# Patient Record
Sex: FEMALE | Race: WHITE
[De-identification: names, ages, dates, MRNs, and addresses within clinical notes are randomized per-mention and may not be internally consistent; named-entity substitution may affect disease eponyms.]

---

## 2019-09-20 ENCOUNTER — HOSPITAL ENCOUNTER (EMERGENCY)
Dept: HOSPITAL 62 - ER | Age: 44
Discharge: HOME | End: 2019-09-20
Payer: SELF-PAY

## 2019-09-20 VITALS — SYSTOLIC BLOOD PRESSURE: 141 MMHG | DIASTOLIC BLOOD PRESSURE: 77 MMHG

## 2019-09-20 DIAGNOSIS — J20.9: Primary | ICD-10-CM

## 2019-09-20 DIAGNOSIS — R05: ICD-10-CM

## 2019-09-20 DIAGNOSIS — R07.9: ICD-10-CM

## 2019-09-20 DIAGNOSIS — R06.02: ICD-10-CM

## 2019-09-20 PROCEDURE — 99284 EMERGENCY DEPT VISIT MOD MDM: CPT

## 2019-09-20 PROCEDURE — 96374 THER/PROPH/DIAG INJ IV PUSH: CPT

## 2019-09-20 PROCEDURE — 71045 X-RAY EXAM CHEST 1 VIEW: CPT

## 2019-09-20 PROCEDURE — 94640 AIRWAY INHALATION TREATMENT: CPT

## 2019-09-20 NOTE — RADIOLOGY REPORT (SQ)
EXAM DESCRIPTION:  CHEST SINGLE VIEW



COMPLETED DATE/TIME:  9/20/2019 8:09 am



REASON FOR STUDY:  SOB



COMPARISON:  None.



NUMBER OF VIEWS:  One view.



TECHNIQUE:  Single frontal radiographic image of the chest acquired.



LIMITATIONS:  None.



FINDINGS:  LUNGS AND PLEURA: Stable appearance.

MEDIASTINUM AND HILAR STRUCTURES: Stable heart size and mediastinal structures.

HEART AND VASCULAR STRUCTURES: Stable appearance.

SUPPORT DEVICES: Appropriate location without change.

BONES: No acute findings.

OTHER: No other significant finding.



IMPRESSION:  STABLE APPEARANCE OF THE CHEST.  SUPPORT DEVICES UNCHANGED.



TECHNICAL DOCUMENTATION:  JOB ID:  [id number]

 2011 [employer]- All Rights Reserved



Reading location - IP/workstation name: [email or web address]

## 2019-09-20 NOTE — ER DOCUMENT REPORT
Entered by EMILY GALICIA SCRIBE  09/20/19 0826 





Acting as scribe for:SHANTEL SHEIKH MD





ED Respiratory Problem





- General


Chief Complaint: Breathing Difficulty


Stated Complaint: COUGH,WHEEZING,CHEST TIGHTNESS


Time Seen by Provider: 09/20/19 08:18


Mode of Arrival: Ambulatory


Information source: Patient


Notes: 





Patient is a 43-year-old female that recently moved to the area from [place] who 

presents to the emergency department today with complaints of a 2-week history 

of shortness of breath.  Patient states that she recently moved here from [place].

 Patient states she has no respiratory medical history other than bronchitis.  

Patient states she thinks there could be a variety of reasons for this shortness

of breath including the new humidity here in [place], mold in her new 

house, and "drywall dust".  Patient states she has been bringing up green and 

yellow sputum with her cough.  Patient mentions she now has some chest soreness 

associated with the cough.  Patient states her last menstrual period was "less 

than a month ago".  Patient denies any nasal congestion.


TRAVEL OUTSIDE OF THE [place] IN LAST 30 DAYS: No





- Related Data


Allergies/Adverse Reactions: 


                                        





amoxicillin Allergy (Verified 09/20/19 06:30)


   


Penicillins Allergy (Verified 09/20/19 06:30)


   











Past Medical History





- General


Information source: Patient





- Social History


Smoking Status: Never Smoker


Cigarette use (# per day): No


Chew tobacco use (# tins/day): No


Smoking Education Provided: No


Frequency of alcohol use: None


Drug Abuse: None


Lives with: Family


Family History: Reviewed & Not Pertinent


Patient has suicidal ideation: No


Patient has homicidal ideation: No


Pulmonary Medical History: Reports: Hx Bronchitis





Review of Systems





- Review of Systems


Constitutional: No symptoms reported


EENT: denies: Nose congestion


Cardiovascular: No symptoms reported


Respiratory: See HPI, Cough, Short of breath, Sputum, Wheezing


Gastrointestinal: No symptoms reported


Genitourinary: No symptoms reported


Female Genitourinary: See HPI, Last menstrual period - "less than a month ago"


Musculoskeletal: No symptoms reported


Skin: No symptoms reported


Hematologic/Lymphatic: No symptoms reported


Neurological/Psychological: No symptoms reported


-: Yes All other systems reviewed and negative





Physical Exam





- Vital signs


Vitals: 


                                        











Temp Pulse Resp BP Pulse Ox


 


 98.3 F   97   19   145/76 H  97 


 


 09/20/19 06:32  09/20/19 06:32  09/20/19 06:32  09/20/19 06:32  09/20/19 06:32














- Notes


Notes: 





Physical Exam:


 


General: Alert, appears well. 


 


HEENT: Normocephalic. Atraumatic. PERRL. Extraocular movements intact. 

Oropharynx clear. No posterior oropharynx erythema, exudate, or swelling.


 


Neck: Supple. Non-tender.


 


Respiratory: Lung exam performed after breathing treatment had been 

administered.  Rhonchi and wheezing bilaterally with forced cough, right greater

than left.


 


Cardiovascular: Regular rate and rhythm. 


 


Abdominal: Normal Inspection. Non-tender. No distension. Normal Bowel Sounds. 


 


Back: No gross abnormalities. 


 


Extremities: Moves all four extremities.


Upper extremities: Normal inspection. Normal ROM.  


Lower extremities: Normal inspection. No edema. Normal ROM.


 


Neurological: Normal cognition. AAOx4. Normal speech.  


 


Psychological: Normal affect. Normal Mood. 


 


Skin: Warm. Dry. Normal color.





Course





- Re-evaluation


Re-evalutation: 





09/20/19 11:29


Patient is much improved after the breathing treatments.





- Vital Signs


Vital signs: 


                                        











Temp Pulse Resp BP Pulse Ox


 


 98.4 F   85   16   129/91 H  97 


 


 09/20/19 10:27  09/20/19 10:27  09/20/19 10:27  09/20/19 10:27  09/20/19 10:27














- Diagnostic Test


Radiology reviewed: Image reviewed, Reports reviewed - Chest x-ray is 

unremarkable.





Discharge





- Discharge


Clinical Impression: 


 Acute bronchitis with bronchospasm





Condition: Stable


Disposition: HOME, SELF-CARE


Additional Instructions: 


Bronchitis with Bronchospasm (Wheezing)





     You have  bronchitis with bronchospasm (wheezing).  Sometimes people 

develop wheezing with a chest cold.  This occurs either because of an underlying

tendency toward asthma or because the virus itself irritates the bronchial 

tubes.  This irritation causes cough, shortness of breath, and wheezing.


     Emergency treatment of bronchospasm may include adrenaline shots or 

bronchodilator aerosol.  You may feel lightheaded and have a rapid pulse for an 

hour or two.  Rest and get plenty of fluids.


     At home, we'll treat you with a bronchodilator inhaler. Corticosteroids may

be required for some patients. Until you recover, avoid chemical fumes, dusts, 

pollens, and exercising in very cold or dry air. If you smoke, stop now!


     Most cases of bronchitis get better without antibiotics.  We prescribe 

antibiotics when we believe bacteria are damaging your airways, or if there's 

high risk the bronchitis will worsen into pneumonia. Increase your fluid intake.

A cool mist humidifier may make your lungs more comfortable.  An expectorant 

(cough medicine that loosens phlegm) can help.


     Repeated episodes of bronchitis and bronchospasm may result in lung damage 

-- for example, chronic bronchitis, recurrent pneumonias, or emphysema. If you 

develop a fever, increased wheezing, chest pain, or severe shortness of breath, 

you should contact the doctor immediately.








 

********************************************************************************


***************************************





Take the medications as prescribed--start the prednisone and Zithromax tomorrow.


Drink plenty of fluids and get plenty of rest.


Follow-up with a local medical doctor if not improving.





RETURN TO THE EMERGENCY ROOM IF ANY NEW OR WORSENING SYMPTOMS.








Prescriptions: 


Prednisone [Deltasone 10 mg Tablet] 10 mg PO ASDIR PRN #21 tablet


 PRN Reason: 


Albuterol Sulfate [Proair Hfa Inhalation Aerosol 8.5 gm Mdi] 2 puff IH ASDIR PRN

#1 mdi


 PRN Reason: 


Benzonatate [Tessalon Perles 100 mg Capsule] 100 mg PO ASDIR PRN #30 capsule


 PRN Reason: 


Azithromycin [Zithromax 250 mg Tablet] 250 mg PO DAILY #4 tablet


Scribe Attestation: 





09/20/19 11:35


I personally performed the services described in the documentation, reviewed and

edited the documentation which was dictated to the scribe in my presence, and it

accurately records my words and actions.





I personally performed the services described in the documentation, reviewed and

edited the documentation which was dictated to the scribe in my presence, and it

accurately records my words and actions.